# Patient Record
Sex: MALE | Race: WHITE
[De-identification: names, ages, dates, MRNs, and addresses within clinical notes are randomized per-mention and may not be internally consistent; named-entity substitution may affect disease eponyms.]

---

## 2021-07-12 ENCOUNTER — HOSPITAL ENCOUNTER (INPATIENT)
Dept: HOSPITAL 11 - JP.SDS | Age: 81
LOS: 3 days | Discharge: HOME | DRG: 470 | End: 2021-07-15
Attending: ORTHOPAEDIC SURGERY | Admitting: ORTHOPAEDIC SURGERY
Payer: MEDICARE

## 2021-07-12 DIAGNOSIS — M25.852: ICD-10-CM

## 2021-07-12 DIAGNOSIS — D62: ICD-10-CM

## 2021-07-12 DIAGNOSIS — M16.12: Primary | ICD-10-CM

## 2021-07-12 DIAGNOSIS — E11.9: ICD-10-CM

## 2021-07-12 DIAGNOSIS — S73.102A: ICD-10-CM

## 2021-07-12 DIAGNOSIS — Z79.899: ICD-10-CM

## 2021-07-12 DIAGNOSIS — G47.30: ICD-10-CM

## 2021-07-12 DIAGNOSIS — I10: ICD-10-CM

## 2021-07-12 DIAGNOSIS — Z79.82: ICD-10-CM
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
## 2021-07-12 PROCEDURE — C1776 JOINT DEVICE (IMPLANTABLE): HCPCS

## 2021-07-12 PROCEDURE — 0SRB01A REPLACEMENT OF LEFT HIP JOINT WITH METAL SYNTHETIC SUBSTITUTE, UNCEMENTED, OPEN APPROACH: ICD-10-PCS | Performed by: ORTHOPAEDIC SURGERY

## 2021-07-12 PROCEDURE — C1713 ANCHOR/SCREW BN/BN,TIS/BN: HCPCS

## 2021-07-12 RX ADMIN — Medication SCH APPLIC: at 21:23

## 2021-07-12 RX ADMIN — OXYCODONE HYDROCHLORIDE AND ACETAMINOPHEN PRN TAB: 5; 325 TABLET ORAL at 12:38

## 2021-07-12 RX ADMIN — Medication SCH: at 21:17

## 2021-07-12 RX ADMIN — OXYCODONE HYDROCHLORIDE AND ACETAMINOPHEN PRN TAB: 5; 325 TABLET ORAL at 21:15

## 2021-07-12 NOTE — CR
Pelvis 1V or 2V

 

CLINICAL HISTORY: Status post CORAL

 

FINDINGS: Patient is status post recent total hip arthroplasty. Components

appear well seated. There is some osteoarthritic change in the right hip

 

 

 

IMPRESSION: Status post total left hip arthroplasty

## 2021-07-13 RX ADMIN — OXYCODONE HYDROCHLORIDE AND ACETAMINOPHEN PRN TAB: 5; 325 TABLET ORAL at 02:38

## 2021-07-13 RX ADMIN — HYDROCODONE BITARTRATE AND ACETAMINOPHEN PRN TAB: 5; 325 TABLET ORAL at 20:19

## 2021-07-13 RX ADMIN — Medication SCH APPLIC: at 08:46

## 2021-07-13 RX ADMIN — Medication SCH: at 21:51

## 2021-07-13 RX ADMIN — OXYCODONE HYDROCHLORIDE AND ACETAMINOPHEN PRN TAB: 5; 325 TABLET ORAL at 10:40

## 2021-07-13 RX ADMIN — Medication SCH APPLIC: at 20:20

## 2021-07-13 RX ADMIN — Medication SCH MG: at 08:48

## 2021-07-13 RX ADMIN — Medication SCH: at 08:46

## 2021-07-13 NOTE — PCM.SURGPN
- General Info


Date of Service: 07/13/21


Date of Surgery/Procedure: 07/12/21


POD#: 1


Post-Op Diagnosis: left hip osteoarthritis


Functional Status: Reports: Pain Controlled, Tolerating Diet





- Review of Systems


General: Reports: No Symptoms


Musculoskeletal: Reports: Leg Pain (left ), Joint Pain (left hip )


Skin: Reports: Other (dried drainage from incision )


Neurological: Reports: No Symptoms


Psychiatric: Reports: No Symptoms





- Patient Data


Vitals - Most Recent: 


                                Last Vital Signs











Temp  96.9 F   07/13/21 07:26


 


Pulse  75   07/13/21 07:26


 


Resp  18   07/13/21 07:26


 


BP  114/64   07/13/21 07:26


 


Pulse Ox  95   07/13/21 07:26











Weight - Most Recent: 204 lb


I&O - Last 24 Hours: 


                                 Intake & Output











 07/12/21 07/13/21 07/13/21





 22:59 06:59 14:59


 


Intake Total 715 1413 


 


Output Total 700 750 


 


Balance 15 663 











Lab Results Last 24 Hrs: 


                         Laboratory Results - last 24 hr











  07/12/21 07/13/21 07/13/21 Range/Units





  18:25 06:03 07:48 


 


WBC   7.1   (4.5-11.0)  K/uL


 


RBC   3.37 L   (4.30-5.90)  M/uL


 


Hgb   10.8 L   (12.0-15.0)  g/dL


 


Hct   31.6 L   (40.0-54.0)  %


 


MCV   94   (80-98)  fL


 


MCH   32 H   (27-31)  pg


 


MCHC   34   (32-36)  %


 


Plt Count   182   (150-400)  K/uL


 


POC Glucose  177 H   167 H  ()  mg/dL











Med Orders - Current: 


                               Current Medications





Acetaminophen (Acetaminophen 325 Mg Tab)  650 mg PO Q4H PRN


   PRN Reason: Pain/Fever


Hydrocodone Bitart/Acetaminophen (Acetaminophen/Hydrocodone 325-5 Mg Tab)  1 tab

PO Q4H PRN


   PRN Reason: Pain (mild 1-3)


Amlodipine Besylate (Amlodipine 5 Mg Tab)  10 mg PO DAILY [facility]


Bandage/Support Products (Nozin Nasal )  1 applic NASBOTH BID [facility]


   Stop: 07/19/21 21:01


   Last Admin: 07/12/21 21:23 Dose:  1 applic


   Documented by: 


Clopidogrel Bisulfate (Clopidogrel 75 Mg Tab)  75 mg PO DAILY [facility]


Cyclobenzaprine HCl (Cyclobenzaprine 10 Mg Tab)  10 mg PO BID PRN


   PRN Reason: Muscle Spasm - Painful


   Last Admin: 07/12/21 23:33 Dose:  10 mg


   Documented by: 


Diphenhydramine HCl (Diphenhydramine 25 Mg Cap)  25 mg PO Q6H PRN


   PRN Reason: Itching


Docusate Sodium (Docusate Sodium 100 Mg Cap)  100 mg PO BID [facility]


   Last Admin: 07/12/21 21:17 Dose:  100 mg


   Documented by: 


Glimepiride (Glimepiride 2 Mg Tab)  4 mg PO DAILY [facility]


Hydrochlorothiazide (Hydrochlorothiazide 25 Mg Tab)  25 mg PO DAILY [facility]


Sodium Chloride (Normal Saline)  1,000 mls @ 125 mls/hr IV ASDIRECTED [facility]


   Last Admin: 07/12/21 23:33 Dose:  125 mls/hr


   Documented by: 


Cefazolin Sodium/Dextrose 1 gm (/ Premix)  50 mls @ 100 mls/hr IV Q8H [facility]


   Stop: 07/13/21 10:29


   Last Admin: 07/13/21 02:34 Dose:  100 mls/hr


   Documented by: 


Lorazepam (Lorazepam 1 Mg Tab)  1 mg PO BID PRN


   PRN Reason: Anxiety


   Last Admin: 07/12/21 21:15 Dose:  1 mg


   Documented by: 


Losartan Potassium (Losartan 50 Mg Tab)  100 mg PO DAILY [facility]


Magnesium Hydroxide (Magnesium Hydroxide 400 Mg/5 Ml Susp 30 Ml Cup)  30 ml PO 

Q6H PRN


   PRN Reason: Stool Softener


Metoprolol Succinate (Metoprolol Succinate 25 Mg Tab.Er)  25 mg PO DAILY [facility]


Morphine Sulfate (Morphine 2 Mg/Ml Syringe)  1 mg IV Q1H PRN


   PRN Reason: Breakthrough Pain


   Last Admin: 07/12/21 14:34 Dose:  1 mg


   Documented by: 


Niacin (Niacin 250 Mg Tab.Er)  500 mg PO DAILY [facility]


Nitroglycerin (Nitroglycerin 0.4 Mg Tab.Sl)  0.4 mg SL ASDIRECTED PRN


   PRN Reason: Other


Ondansetron HCl (Ondansetron 4 Mg/2 Ml Sdv)  4 mg IVPUSH Q6H PRN


   PRN Reason: Nausea/Vomiting


Oxycodone/Acetaminophen (Acetaminophen/Oxycodone 325-5 Mg Tab)  1 - 2 tab PO Q4H

PRN


   PRN Reason: Pain


   Last Admin: 07/13/21 02:38 Dose:  2 tab


   Documented by: 


Glucosamine 1,000mg (Tab (Ptom))  1 each PO BID [facility]


   Last Admin: 07/12/21 21:17 Dose:  Not Given


   Documented by: 


Rosuvastatin Calcium (Rosuvastatin 10 Mg Tab)  10 mg PO DAILY [facility]





Discontinued Medications





Amlodipine Besylate (Amlodipine 5 Mg Tab)  10 mg PO DAILY [facility]


Bandage/Support Products (Nozin Nasal )  1 applic NASBOTH ONETIME ONE


   Stop: 07/12/21 08:01


   Last Admin: 07/12/21 08:20 Dose:  3 swab


   Documented by: 


Clopidogrel Bisulfate (Clopidogrel 75 Mg Tab)  75 mg PO DAILY [facility]


Docusate Sodium (Docusate Sodium 100 Mg Cap)  100 mg PO BID [facility]


Ephedrine Sulfate (Ephedrine 50 Mg/Ml Sdv) Confirm Administered Dose 50 mg 

.ROUTE .STK-MED ONE


   Stop: 07/12/21 10:15


Fentanyl (Fentanyl 100 Mcg/2 Ml Sdv) Confirm Administered Dose 100 mcg .ROUTE 

.STK-MED ONE


   Stop: 07/12/21 08:30


Lactated Ringer's (Ringers, Lactated)  1,000 mls @ 75 mls/hr IV ASDIRECTED [facility]


   Last Admin: 07/12/21 08:21 Dose:  75 mls/hr


   Documented by: 


Cefazolin Sodium/Dextrose 2 gm (/ Premix)  50 mls @ 100 mls/hr IV ONETIME ONE


   Stop: 07/12/21 08:59


   Last Admin: 07/12/21 09:47 Dose:  100 mls/hr


   Documented by: 


Cefazolin Sodium 1 gm/ Sodium (Chloride)  50 mls @ 200 mls/hr IV Q8H [facility]


   Stop: 07/13/21 03:59


   Last Admin: 07/12/21 15:29 Dose:  Not Given


   Documented by: 


Ketorolac Tromethamine (Ketorolac 30 Mg/Ml Sdv)  30 mg IVPUSH ONETIME ONE


   Stop: 07/12/21 15:41


   Last Admin: 07/12/21 15:49 Dose:  30 mg


   Documented by: 


Metoprolol Succinate (Metoprolol Succinate 25 Mg Tab.Er)  25 mg PO DAILY [facility]


Midazolam HCl (Midazolam 1 Mg/Ml 2 Ml Sdv) Confirm Administered Dose 2 mg .ROUTE

.STK-MED ONE


   Stop: 07/12/21 08:31


Midazolam HCl (Midazolam 1 Mg/Ml 2 Ml Sdv) Confirm Administered Dose 2 mg .ROUTE

.STK-MED ONE


   Stop: 07/12/21 08:57


Non-Formulary Medication (Glimepiride [Glimepiride])  4 mg PO DAILY [facility]


Non-Formulary Medication (Glucosamine Sulfate 2kcl [Glucosamine])  1 tab PO BID 

[facility]


Non-Formulary Medication (Hydrochlorothiazide/Losartan [Hyzaar 100-25 Mg])  1 

tab PO DAILY [facility]


Non-Formulary Medication (Niacin [Niacin])  500 mg PO DAILY [facility]


Non-Formulary Medication (Rosuvastatin [Crestor])  20 mg PO DAILY [facility]


Povidone Iodine (Povidone-Iodine 10% Soln 118.25 Ml Bottle) Confirm Administered

Dose 1 ml .ROUTE .STK-MED ONE


   Stop: 07/12/21 06:25


   Last Admin: 07/12/21 10:31 Dose:  15 ml


   Documented by: 


Propofol (Propofol 200 Mg/20 Ml Sdv) Confirm Administered Dose 200 mg .ROUTE 

.STK-MED ONE


   Stop: 07/12/21 08:30











- Exam


Wound/Incisions: Dressing Dry and Intact


Quality Assessment: DVT Prophylaxis


General: Alert, Oriented, Cooperative, No Acute Distress


Extremities: Leg Pain, Increased Warmth


Skin: Dry, Intact


Neurological: No New Focal Deficit


Psy/Mental Status: Alert, Normal Affect, Normal Mood





Sepsis Event Note





- Evaluation


Sepsis Screening Result: No Definite Risk





- Focused Exam


Vital Signs: 


                                   Vital Signs











  Temp Pulse Resp BP Pulse Ox


 


 07/13/21 07:26  96.9 F  75  18  114/64  95


 


 07/13/21 02:35  95 F L  79  16  120/58 L  99


 


 07/12/21 23:34  97.1 F  80  16  126/61  97














- Problem List & Annotations


(1) Postoperative anemia due to acute blood loss


SNOMED Code(s): 76550002452010611


   Code(s): D62 - ACUTE POSTHEMORRHAGIC ANEMIA   Status: Acute   Current Visit: 

Yes   





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


SNOMED Code(s): 010328791601, 896876277617


   Code(s): Z96.642 - PRESENCE OF LEFT ARTIFICIAL HIP JOINT   Status: Acute   

Current Visit: Yes   





- Problem List Review


Problem List Initiated/Reviewed/Updated: Yes





- My Orders


Last 24 Hours: 


                               Active Orders 24 hr











 Category Date Time Status


 


 Patient Status [ADT] Routine ADT  07/12/21 11:34 Active


 


 Ambulate [RC] PER UNIT ROUTINE Care  07/12/21 11:34 Active


 


 Head of Bed Elevation [RC] ASDIRECTED Care  07/12/21 11:34 Active


 


 Intake and Output [RC] PER UNIT ROUTINE Care  07/12/21 11:34 Active


 


 Neurovascular Check [RC] BID Care  07/12/21 11:34 Active


 


 Notify Provider Intake and Out [RC] ASDIRECTED Care  07/12/21 11:34 Active


 


 Notify Provider Vital Signs [RC] ASDIRECTED Care  07/12/21 11:34 Active


 


 Oxygen Therapy [RC] PRN Care  07/12/21 11:34 Active


 


 POC Glucose [Blood Glucose Check, Bedside] [RC] Care  07/12/21 11:54 Active





 BIDMEALS   


 


 Pneumonia Education [RC] UPON Care  07/12/21 11:34 Active


 


 Pulse Oximetry [RC] INTERMITTENT Care  07/12/21 11:34 Active


 


 RT Incentive Spirometry [RC] Q1HWA Care  07/12/21 11:34 Active


 


 Up to Chair [RC] QID Care  07/12/21 11:34 Active


 


 Vital Signs [RC] PER UNIT ROUTINE Care  07/12/21 11:34 Active


 


 Wound Care [RC] Q12H Care  07/12/21 11:34 Active


 


 Consult to Case Management/ [CONS] Cons  07/12/21 11:34 Active





 Routine   


 


 OT Evaluation and Treatment [CONS] Routine Cons  07/12/21 11:37 Active


 


 PT Evaluation and Treatment [CONS] Routine Cons  07/12/21 11:34 Active


 


 PT Evaluation and Treatment [CONS] Routine Cons  07/12/21 11:34 Active


 


 Consistent Carbohydrate Diet [DIET] Diet  07/12/21 Lunch Active


 


 GLUCOSE POC LAB TO COLLECT JPM [POC] BIDAC Lab  07/13/21 17:00 Ordered


 


 GLUCOSE POC LAB TO COLLECT JPM [POC] BIDAC Lab  07/14/21 08:00 Ordered


 


 GLUCOSE POC LAB TO COLLECT JPM [POC] BIDAC Lab  07/14/21 17:00 Ordered


 


 GLUCOSE POC LAB TO COLLECT JPM [POC] BIDAC Lab  07/15/21 08:00 Ordered


 


 GLUCOSE POC LAB TO COLLECT JPM [POC] BIDAC Lab  07/15/21 17:00 Ordered


 


 GLUCOSE POC LAB TO COLLECT JPM [POC] BIDAC Lab  07/16/21 08:00 Ordered


 


 GLUCOSE POC LAB TO COLLECT JPM [POC] BIDAC Lab  07/16/21 17:00 Ordered


 


 GLUCOSE POC LAB TO COLLECT JPM [POC] BIDAC Lab  07/17/21 08:00 Ordered


 


 GLUCOSE POC LAB TO COLLECT JPM [POC] BIDAC Lab  07/17/21 17:00 Ordered


 


 GLUCOSE POC LAB TO COLLECT JPM [POC] BIDAC Lab  07/18/21 08:00 Ordered


 


 GLUCOSE POC LAB TO COLLECT JPM [POC] BIDAC Lab  07/18/21 17:00 Ordered


 


 GLUCOSE POC LAB TO COLLECT JPM [POC] BIDAC Lab  07/19/21 08:00 Ordered


 


 GLUCOSE POC LAB TO COLLECT JPM [POC] BIDAC Lab  07/19/21 17:00 Ordered


 


 Acetaminophen [TylenoL] Med  07/12/21 11:34 Active





 650 mg PO Q4H PRN   


 


 Acetaminophen/HYDROcodone [Norco 325-5 MG] Med  07/12/21 11:34 Active





 1 tab PO Q4H PRN   


 


 Acetaminophen/oxyCODONE [Percocet 325-5 MG] Med  07/12/21 11:43 Active





 1 - 2 tab PO Q4H PRN   


 


 Clopidogrel [Plavix] Med  07/13/21 09:00 Active





 75 mg PO DAILY   


 


 Cyclobenzaprine [Flexeril] Med  07/12/21 16:35 Active





 10 mg PO BID PRN   


 


 Docusate Sodium [Colace] Med  07/12/21 21:00 Active





 100 mg PO BID   


 


 Glimepiride [Amaryl] Med  07/13/21 09:00 Active





 4 mg PO DAILY   


 


 LORazepam [Ativan] Med  07/12/21 11:40 Active





 1 mg PO BID PRN   


 


 Losartan [Cozaar] Med  07/13/21 09:00 Active





 100 mg PO DAILY   


 


 Magnesium Hydroxide [Milk of Magnesia] Med  07/12/21 11:34 Active





 30 ml PO Q6H PRN   


 


 Metoprolol Succinate [Toprol XL] Med  07/13/21 09:00 Active





 25 mg PO DAILY   


 


 Morphine Med  07/12/21 11:34 Active





 1 mg IV Q1H PRN   


 


 Niacin [Slo-Niacin] Med  07/13/21 09:00 Active





 500 mg PO DAILY   


 


 Nitroglycerin [Nitrostat] Med  07/12/21 11:40 Active





 0.4 mg SL ASDIRECTED PRN   


 


 Nozin [ Nasal ] Med  07/12/21 21:00 Active





 1 applic NASBOTH BID   


 


 Ondansetron [Zofran] Med  07/12/21 11:34 Active





 4 mg IVPUSH Q6H PRN   


 


 Patient's Own Medication [Ptom] Med  07/12/21 21:00 Active





 1 each PO BID   


 


 Rosuvastatin [Crestor] Med  07/13/21 09:00 Active





 10 mg PO DAILY   


 


 Sodium Chloride 0.9% [Normal Saline] 1,000 ml Med  07/12/21 11:45 Active





 IV ASDIRECTED   


 


 amLODIPine [Norvasc] Med  07/13/21 09:00 Active





 10 mg PO DAILY   


 


 ceFAZolin [Ancef 1 GM/50 ML] 1 gm Med  07/12/21 18:00 Active





 Premix Bag 1 bag   





 IV Q8H   


 


 diphenhydrAMINE [Benadryl] Med  07/12/21 11:44 Active





 25 mg PO Q6H PRN   


 


 hydroCHLOROthiazide Med  07/13/21 09:00 Active





 25 mg PO DAILY   


 


 Antiembolic Hose [OM.PC] Per Unit Routine Oth  07/12/21 11:34 Ordered


 


 DME for Inpatients [OM.PC] Routine Oth  07/12/21 11:34 Ordered


 


 DVT/VTE Prophylaxis Reflex [OM.PC] Routine Oth  07/12/21 11:34 Ordered


 


 Ice Therapy [OM.PC] Per Unit Routine Oth  07/12/21 11:34 Ordered


 


 Oral Care [OM.PC] Routine Oth  07/12/21 11:34 Ordered


 


 Sequential Compression Device [OM.PC] Routine Oth  07/12/21 11:34 Ordered


 


 Weight bearing status [OM.PC] Routine Oth  07/12/21 11:34 Ordered


 


 Resuscitation Status Routine Resus Stat  07/12/21 11:34 Ordered


 


 EKG 12 Lead [EK] Urgent Ther  07/12/21 07:20 Ordered








                                Medication Orders





Acetaminophen (Acetaminophen 325 Mg Tab)  650 mg PO Q4H PRN


   PRN Reason: Pain/Fever


Hydrocodone Bitart/Acetaminophen (Acetaminophen/Hydrocodone 325-5 Mg Tab)  1 tab

 PO Q4H PRN


   PRN Reason: Pain (mild 1-3)


Amlodipine Besylate (Amlodipine 5 Mg Tab)  10 mg PO DAILY [facility]


Bandage/Support Products (Nozin Nasal )  1 applic NASBOTH BID [facility]


   Stop: 07/19/21 21:01


   Last Admin: 07/12/21 21:23  Dose: 1 applic


   Documented by: JOE


Clopidogrel Bisulfate (Clopidogrel 75 Mg Tab)  75 mg PO DAILY [facility]


Cyclobenzaprine HCl (Cyclobenzaprine 10 Mg Tab)  10 mg PO BID PRN


   PRN Reason: Muscle Spasm - Painful


   Last Admin: 07/12/21 23:33  Dose: 10 mg


   Documented by: JOE


Diphenhydramine HCl (Diphenhydramine 25 Mg Cap)  25 mg PO Q6H PRN


   PRN Reason: Itching


Docusate Sodium (Docusate Sodium 100 Mg Cap)  100 mg PO BID [facility]


   Last Admin: 07/12/21 21:17  Dose: 100 mg


   Documented by: JOE


Glimepiride (Glimepiride 2 Mg Tab)  4 mg PO DAILY [facility]


Hydrochlorothiazide (Hydrochlorothiazide 25 Mg Tab)  25 mg PO DAILY [facility]


Sodium Chloride (Normal Saline)  1,000 mls @ 125 mls/hr IV ASDIRECTED [facility]


   Last Admin: 07/12/21 23:33  Dose: 125 mls/hr


   Documented by: JOE


Cefazolin Sodium/Dextrose 1 gm (/ Premix)  50 mls @ 100 mls/hr IV Q8H [facility]


   Stop: 07/13/21 10:29


   Last Admin: 07/13/21 02:34  Dose: 100 mls/hr


   Documented by: JOE


   Infusion: 07/12/21 18:25  Dose: 100 mls/hr


   Documented by: JOE


   Admin: 07/12/21 17:55  Dose: 100 mls/hr


   Documented by: JENNI


Lorazepam (Lorazepam 1 Mg Tab)  1 mg PO BID PRN


   PRN Reason: Anxiety


   Last Admin: 07/12/21 21:15  Dose: 1 mg


   Documented by: JOE


Losartan Potassium (Losartan 50 Mg Tab)  100 mg PO DAILY [facility]


Magnesium Hydroxide (Magnesium Hydroxide 400 Mg/5 Ml Susp 30 Ml Cup)  30 ml PO 

Q6H PRN


   PRN Reason: Stool Softener


Metoprolol Succinate (Metoprolol Succinate 25 Mg Tab.Er)  25 mg PO DAILY [facility]


Morphine Sulfate (Morphine 2 Mg/Ml Syringe)  1 mg IV Q1H PRN


   PRN Reason: Breakthrough Pain


   Last Admin: 07/12/21 14:34  Dose: 1 mg


   Documented by: MANUELITO


   Admin: 07/12/21 13:36  Dose: 1 mg


   Documented by: YOHANNES


Niacin (Niacin 250 Mg Tab.Er)  500 mg PO DAILY [facility]


Nitroglycerin (Nitroglycerin 0.4 Mg Tab.Sl)  0.4 mg SL ASDIRECTED PRN


   PRN Reason: Other


Ondansetron HCl (Ondansetron 4 Mg/2 Ml Sdv)  4 mg IVPUSH Q6H PRN


   PRN Reason: Nausea/Vomiting


Oxycodone/Acetaminophen (Acetaminophen/Oxycodone 325-5 Mg Tab)  1 - 2 tab PO Q4H

 PRN


   PRN Reason: Pain


   Last Admin: 07/13/21 02:38  Dose: 2 tab


   Documented by: JOE


   Admin: 07/12/21 21:15  Dose: 2 tab


   Documented by: JOE


   Admin: 07/12/21 12:38  Dose: 2 tab


   Documented by: JENNI


Glucosamine 1,000mg (Tab (Ptom))  1 each PO BID [facility]


   Last Admin: 07/12/21 21:17 Dose:  Not Given


   Documented by: JOE


Rosuvastatin Calcium (Rosuvastatin 10 Mg Tab)  10 mg PO DAILY [facility]











- Assessment


Assessment           (Free Text/Narrative):: 


Patient is a pleasant 80-year-old male, status post left total hip arthroplasty,

 postop day #1.  No acute events overnight.  Patient remains hemodynamically 

stable, vitals within normal limits.  Patient denied subjective fever, chills, 

dyspnea, chest pain. Endorsed using incentive spirometer 2-3 times hourly. 





Postop day #1 hemoglobin declined to 10.8.  Patient appears asymptomatic with 

this; had one episode of dizziness last night with sit to stand transfer, but 

denied any dizziness or lightheadedness thereafter.





Patient had difficulty with pain control yesterday afternoon after the block 

wore off.  Since then, patient has obtained good pain control with Percocet and 

Flexeril.  Denied numbness or tingling to the lower extremity.  Had one episode 

of nausea yesterday afternoon, Zofran was utilized.  Patient denied any nausea 

or vomiting since and has been tolerating regular diet well.





Patient worked with physical therapy yesterday afternoon following surgery; 

performed bilateral lower extremity strengthening exercises and transferred from

 bed to chair with four-wheel walker.  Patient also ambulated from bed to 

bathroom yesterday evening with FWW and x1 assist. 





Patient did have some drainage from the left hip dressing yesterday evening; 

nursing staff reinforce dressing with extra ABD.  Has had no drainage since.





Patient requires inpatient status at this time for further physical therapy 

services to progress ambulation abilities and for adequate pain control with PO 

medications prior to discharge home.





Exam: Left lower extremity neurovascular intact.  Calf is soft and supple.  

Dried drainage on left hip dressing, no active drainage.  No surrounding 

ecchymosis of L hip.  Mild warmth to touch of left hip.  Mild edema of left 

lower extremity compared to right lower extremity.  Dorsiflexion 4+/5.  Plantar 

flexion 5/5.





Plan: 


* Participate to in physical therapy and occupational therapy daily while in the

   hospital. 


* Postoperative anemia stable at this time; may recheck CBC if patient becomes 

  symptomatic.


* Continue with current pain regimen.


* Nursing staff may discontinue linares this morning and saline lock patients IV; 

  this was communicated to nursing staff this morning. 


* Dressing change will be performed on POD#2 by orthopedic provider. 


* Continue with mechanical DVT/VTE prophylaxis with bilateral SCDs, chemical 

  prophylaxis with patient's home clopidogrel dose.


* Anticipate discharge to home when medically stable, pain controlled with PO 

  medications, and ambulation abilities progress. Patient interested in 

  outpatient PT at time of discharge.

## 2021-07-14 RX ADMIN — Medication SCH: at 21:21

## 2021-07-14 RX ADMIN — OXYCODONE HYDROCHLORIDE AND ACETAMINOPHEN PRN TAB: 5; 325 TABLET ORAL at 22:45

## 2021-07-14 RX ADMIN — OXYCODONE HYDROCHLORIDE AND ACETAMINOPHEN PRN TAB: 5; 325 TABLET ORAL at 14:28

## 2021-07-14 RX ADMIN — OXYCODONE HYDROCHLORIDE AND ACETAMINOPHEN PRN TAB: 5; 325 TABLET ORAL at 08:20

## 2021-07-14 RX ADMIN — Medication SCH MG: at 08:23

## 2021-07-14 RX ADMIN — OXYCODONE HYDROCHLORIDE AND ACETAMINOPHEN PRN TAB: 5; 325 TABLET ORAL at 18:40

## 2021-07-14 RX ADMIN — Medication SCH: at 08:23

## 2021-07-14 RX ADMIN — Medication SCH APPLIC: at 21:20

## 2021-07-14 RX ADMIN — Medication SCH APPLIC: at 08:20

## 2021-07-14 RX ADMIN — HYDROCODONE BITARTRATE AND ACETAMINOPHEN PRN TAB: 5; 325 TABLET ORAL at 01:26

## 2021-07-14 NOTE — [id number].[id number]
- General Info


Date of Service: 07/14/21


Date of Surgery/Procedure: 07/12/21


POD#: 2


Post-Op Diagnosis: left hip osteoarthritis


Functional Status: Reports: Tolerating Diet, Ambulating (with fww, x1 standby 

assist ), Urinating, Incentive Spirometry





- Review of Systems


General: Reports: No Symptoms


HEENT: Reports: No Symptoms


Pulmonary: Reports: No Symptoms


Cardiovascular: Reports: No Symptoms


Gastrointestinal: Reports: No Symptoms


Genitourinary: Reports: No Symptoms


Musculoskeletal: Reports: Leg Pain (L ), Joint Pain (L hip )


Skin: Reports: Bruising


Neurological: Reports: No Symptoms


Psychiatric: Reports: No Symptoms





- Patient Data


Vitals - Most Recent: 


                                Last Vital Signs











Temp  96.1 F L  07/14/21 15:00


 


Pulse  70   07/14/21 15:00


 


Resp  16   07/14/21 15:00


 


BP  126/55 L  07/14/21 15:00


 


Pulse Ox  95   07/14/21 15:00











Weight - Most Recent: 204 lb 0.005 oz


I&O - Last 24 Hours: 


                                 Intake & Output











 07/14/21 07/14/21 07/14/21





 06:59 14:59 22:59


 


Intake Total 300 480 


 


Balance 300 480 











Lab Results Last 24 Hrs: 


                         Laboratory Results - last 24 hr











  07/14/21 07/14/21 Range/Units





  08:06 16:58 


 


POC Glucose  92  147 H  ()  mg/dL











Med Orders - Current: 


                               Current Medications





Acetaminophen (Acetaminophen 325 Mg Tab)  650 mg PO Q4H PRN


   PRN Reason: Pain/Fever


   Last Admin: 07/14/21 11:44 Dose:  650 mg


   Documented by: 


Hydrocodone Bitart/Acetaminophen (Acetaminophen/Hydrocodone 325-5 Mg Tab)  1 tab

PO Q4H PRN


   PRN Reason: Pain (mild 1-3)


   Last Admin: 07/14/21 01:26 Dose:  1 tab


   Documented by: 


Amlodipine Besylate (Amlodipine 5 Mg Tab)  10 mg PO DAILY [facility]


   Last Admin: 07/14/21 08:22 Dose:  10 mg


   Documented by: 


Bandage/Support Products (Nozin Nasal )  1 applic NASBOTH BID [facility]


   Stop: 07/19/21 21:01


   Last Admin: 07/14/21 08:20 Dose:  1 applic


   Documented by: 


Clopidogrel Bisulfate (Clopidogrel 75 Mg Tab)  75 mg PO DAILY [facility]


   Last Admin: 07/14/21 08:22 Dose:  75 mg


   Documented by: 


Cyclobenzaprine HCl (Cyclobenzaprine 10 Mg Tab)  10 mg PO BID PRN


   PRN Reason: Muscle Spasm - Painful


   Last Admin: 07/14/21 01:55 Dose:  10 mg


   Documented by: 


Diphenhydramine HCl (Diphenhydramine 25 Mg Cap)  25 mg PO Q6H PRN


   PRN Reason: Itching


   Last Admin: 07/13/21 22:35 Dose:  25 mg


   Documented by: 


Docusate Sodium (Docusate Sodium 100 Mg Cap)  100 mg PO BID [facility]


   Last Admin: 07/14/21 08:21 Dose:  100 mg


   Documented by: 


Glimepiride (Glimepiride 2 Mg Tab)  4 mg PO DAILY [facility]


   Last Admin: 07/14/21 08:21 Dose:  4 mg


   Documented by: 


Hydrochlorothiazide (Hydrochlorothiazide 25 Mg Tab)  25 mg PO DAILY [facility]


   Last Admin: 07/14/21 08:22 Dose:  25 mg


   Documented by: 


Sodium Chloride (Normal Saline)  1,000 mls @ 125 mls/hr IV ASDIRECTED [facility]


   Last Admin: 07/12/21 23:33 Dose:  125 mls/hr


   Documented by: 


Lorazepam (Lorazepam 1 Mg Tab)  1 mg PO BID PRN


   PRN Reason: Anxiety


   Last Admin: 07/13/21 22:35 Dose:  1 mg


   Documented by: 


Losartan Potassium (Losartan 50 Mg Tab)  100 mg PO DAILY [facility]


   Last Admin: 07/14/21 08:21 Dose:  100 mg


   Documented by: 


Magnesium Hydroxide (Magnesium Hydroxide 400 Mg/5 Ml Susp 30 Ml Cup)  30 ml PO 

Q6H PRN


   PRN Reason: Stool Softener


Metoprolol Succinate (Metoprolol Succinate 25 Mg Tab.Er)  25 mg PO DAILY [facility]


   Last Admin: 07/14/21 08:23 Dose:  25 mg


   Documented by: 


Morphine Sulfate (Morphine 2 Mg/Ml Syringe)  1 mg IV Q1H PRN


   PRN Reason: Breakthrough Pain


   Last Admin: 07/12/21 14:34 Dose:  1 mg


   Documented by: 


Niacin (Niacin 250 Mg Tab.Er)  500 mg PO DAILY [facility]


   Last Admin: 07/14/21 08:23 Dose:  500 mg


   Documented by: 


Nitroglycerin (Nitroglycerin 0.4 Mg Tab.Sl)  0.4 mg SL ASDIRECTED PRN


   PRN Reason: Other


Ondansetron HCl (Ondansetron 4 Mg/2 Ml Sdv)  4 mg IVPUSH Q6H PRN


   PRN Reason: Nausea/Vomiting


Oxycodone/Acetaminophen (Acetaminophen/Oxycodone 325-5 Mg Tab)  1 - 2 tab PO Q4H

PRN


   PRN Reason: Pain


   Last Admin: 07/14/21 18:40 Dose:  2 tab


   Documented by: 


Glucosamine 1,000mg (Tab (Ptom))  1 each PO BID [facility]


   Last Admin: 07/14/21 08:23 Dose:  Not Given


   Documented by: 


Rosuvastatin Calcium (Rosuvastatin 10 Mg Tab)  10 mg PO DAILY [facility]


   Last Admin: 07/14/21 08:22 Dose:  10 mg


   Documented by: 





Discontinued Medications





Amlodipine Besylate (Amlodipine 5 Mg Tab)  10 mg PO DAILY [facility]


Bandage/Support Products (Nozin Nasal )  1 applic NASBOTH ONETIME ONE


   Stop: 07/12/21 08:01


   Last Admin: 07/12/21 08:20 Dose:  3 swab


   Documented by: 


Clopidogrel Bisulfate (Clopidogrel 75 Mg Tab)  75 mg PO DAILY [facility]


Docusate Sodium (Docusate Sodium 100 Mg Cap)  100 mg PO BID [facility]


Ephedrine Sulfate (Ephedrine 50 Mg/Ml Sdv) Confirm Administered Dose 50 mg 

.ROUTE .STK-MED ONE


   Stop: 07/12/21 10:15


Fentanyl (Fentanyl 100 Mcg/2 Ml Sdv) Confirm Administered Dose 100 mcg .ROUTE 

.STK-MED ONE


   Stop: 07/12/21 08:30


Lactated Ringer's (Ringers, Lactated)  1,000 mls @ 75 mls/hr IV ASDIRECTED [facility]


   Last Admin: 07/12/21 08:21 Dose:  75 mls/hr


   Documented by: 


Cefazolin Sodium/Dextrose 2 gm (/ Premix)  50 mls @ 100 mls/hr IV ONETIME ONE


   Stop: 07/12/21 08:59


   Last Admin: 07/12/21 09:47 Dose:  100 mls/hr


   Documented by: 


Cefazolin Sodium 1 gm/ Sodium (Chloride)  50 mls @ 200 mls/hr IV Q8H [facility]


   Stop: 07/13/21 03:59


   Last Admin: 07/12/21 15:29 Dose:  Not Given


   Documented by: 


Cefazolin Sodium/Dextrose 1 gm (/ Premix)  50 mls @ 100 mls/hr IV Q8H [facility]


   Stop: 07/13/21 10:29


   Last Admin: 07/13/21 10:40 Dose:  100 mls/hr


   Documented by: 


Ketorolac Tromethamine (Ketorolac 30 Mg/Ml Sdv)  30 mg IVPUSH ONETIME ONE


   Stop: 07/12/21 15:41


   Last Admin: 07/12/21 15:49 Dose:  30 mg


   Documented by: 


Metoprolol Succinate (Metoprolol Succinate 25 Mg Tab.Er)  25 mg PO DAILY [facility]


Midazolam HCl (Midazolam 1 Mg/Ml 2 Ml Sdv) Confirm Administered Dose 2 mg .ROUTE

.STK-MED ONE


   Stop: 07/12/21 08:31


Midazolam HCl (Midazolam 1 Mg/Ml 2 Ml Sdv) Confirm Administered Dose 2 mg .ROUTE

.STK-MED ONE


   Stop: 07/12/21 08:57


Non-Formulary Medication (Glimepiride [Glimepiride])  4 mg PO DAILY [facility]


Non-Formulary Medication (Glucosamine Sulfate 2kcl [Glucosamine])  1 tab PO BID 

[facility]


Non-Formulary Medication (Hydrochlorothiazide/Losartan [Hyzaar 100-25 Mg])  1 

tab PO DAILY [facility]


Non-Formulary Medication (Niacin [Niacin])  500 mg PO DAILY [facility]


Non-Formulary Medication (Rosuvastatin [Crestor])  20 mg PO DAILY [facility]


Povidone Iodine (Povidone-Iodine 10% Soln 118.25 Ml Bottle) Confirm Administered

 Dose 1 ml .ROUTE .STK-MED ONE


   Stop: 07/12/21 06:25


   Last Admin: 07/12/21 10:31 Dose:  15 ml


   Documented by: 


Propofol (Propofol 200 Mg/20 Ml Sdv) Confirm Administered Dose 200 mg .ROUTE 

.STK-MED ONE


   Stop: 07/12/21 08:30











- Exam


Wound/Incisions: Healing Well, Dressing Dry and Intact, No Drainage


Quality Assessment: DVT Prophylaxis


General: Alert, Oriented, Cooperative, No Acute Distress


Extremities: Normal Capillary Refill, Leg Pain (left ), Limited Range of Motion


Skin: Dry, Intact, Other


Neurological: No New Focal Deficit


Psy/Mental Status: Alert, Normal Affect, Normal Mood





Sepsis Event Note





- Evaluation


Sepsis Screening Result: No Definite Risk





- Focused Exam


Vital Signs: 


                                   Vital Signs











  Temp Pulse Pulse Resp BP BP Pulse Ox


 


 07/14/21 15:00  96.1 F L   70  16   126/55 L  95


 


 07/14/21 11:38  96 F L   76  16   127/51 L  99


 


 07/14/21 08:23   86    142/56 H  


 


 07/14/21 08:22      142/56 H  


 


 07/14/21 08:21      142/56 H  


 


 07/14/21 07:45  97.2 F   86  16   142/56 H  97














- Problem List & Annotations


(1) Postoperative anemia due to acute blood loss


SNOMED Code(s): 40811535177506898


   Code(s): D62 - ACUTE POSTHEMORRHAGIC ANEMIA   Status: Acute   Current Visit: 

Yes   





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


SNOMED Code(s): 815529221132, 256560982776


   Code(s): Z96.642 - PRESENCE OF LEFT ARTIFICIAL HIP JOINT   Status: Acute   

Current Visit: Yes   





- Problem List Review


Problem List Initiated/Reviewed/Updated: Yes





- My Orders


Last 24 Hours: 


                               Active Orders 24 hr











 Category Date Time Status


 


 GLUCOSE POC LAB TO COLLECT JPM [POC] BIDAC Lab  07/15/21 08:00 Ordered


 


 GLUCOSE POC LAB TO COLLECT JPM [POC] BIDAC Lab  07/15/21 17:00 Ordered


 


 GLUCOSE POC LAB TO COLLECT JPM [POC] BIDAC Lab  07/16/21 08:00 Ordered


 


 GLUCOSE POC LAB TO COLLECT JPM [POC] BIDAC Lab  07/16/21 17:00 Ordered


 


 GLUCOSE POC LAB TO COLLECT JPM [POC] BIDAC Lab  07/17/21 08:00 Ordered


 


 GLUCOSE POC LAB TO COLLECT JPM [POC] BIDAC Lab  07/17/21 17:00 Ordered


 


 GLUCOSE POC LAB TO COLLECT JPM [POC] BIDAC Lab  07/18/21 08:00 Ordered


 


 GLUCOSE POC LAB TO COLLECT JPM [POC] BIDAC Lab  07/18/21 17:00 Ordered


 


 GLUCOSE POC LAB TO COLLECT JPM [POC] BIDAC Lab  07/19/21 08:00 Ordered


 


 GLUCOSE POC LAB TO COLLECT JPM [POC] BIDAC Lab  07/19/21 17:00 Ordered








                                Medication Orders





Acetaminophen (Acetaminophen 325 Mg Tab)  650 mg PO Q4H PRN


   PRN Reason: Pain/Fever


   Last Admin: 07/14/21 11:44  Dose: 650 mg


   Documented by: CEZAR


   Admin: 07/13/21 22:35  Dose: 650 mg


   Documented by: NIKOLE


Hydrocodone Bitart/Acetaminophen (Acetaminophen/Hydrocodone 325-5 Mg Tab)  1 tab

 PO Q4H PRN


   PRN Reason: Pain (mild 1-3)


   Last Admin: 07/14/21 01:26  Dose: 1 tab


   Documented by: IVELISSE


   Admin: 07/13/21 20:19  Dose: 1 tab


   Documented by: NIKOLE


Amlodipine Besylate (Amlodipine 5 Mg Tab)  10 mg PO DAILY [facility]


   Last Admin: 07/14/21 08:22  Dose: 10 mg


   Documented by: CEZAR


   Admin: 07/13/21 08:48  Dose: 10 mg


   Documented by: JENNI


Bandage/Support Products (Nozin Nasal )  1 applic NASBOTH BID [facility]


   Stop: 07/19/21 21:01


   Last Admin: 07/14/21 08:20  Dose: 1 applic


   Documented by: CEZAR


   Admin: 07/13/21 20:20  Dose: 1 applic


   Documented by: NIKOLE


   Admin: 07/13/21 08:46  Dose: 1 applic


   Documented by: JENNI


   Admin: 07/12/21 21:23  Dose: 1 applic


   Documented by: JOE


Clopidogrel Bisulfate (Clopidogrel 75 Mg Tab)  75 mg PO DAILY [facility]


   Last Admin: 07/14/21 08:22  Dose: 75 mg


   Documented by: CEZAR


   Admin: 07/13/21 08:50  Dose: 75 mg


   Documented by: JENNI


Cyclobenzaprine HCl (Cyclobenzaprine 10 Mg Tab)  10 mg PO BID PRN


   PRN Reason: Muscle Spasm - Painful


   Last Admin: 07/14/21 01:55  Dose: 10 mg


   Documented by: DENITA


   Admin: 07/13/21 15:00  Dose: 10 mg


   Documented by: JENNI


   Admin: 07/12/21 23:33  Dose: 10 mg


   Documented by: JOE


Diphenhydramine HCl (Diphenhydramine 25 Mg Cap)  25 mg PO Q6H PRN


   PRN Reason: Itching


   Last Admin: 07/13/21 22:35  Dose: 25 mg


   Documented by: NIKOLE


Docusate Sodium (Docusate Sodium 100 Mg Cap)  100 mg PO BID [facility]


   Last Admin: 07/14/21 08:21  Dose: 100 mg


   Documented by: CEZAR


   Admin: 07/13/21 20:20  Dose: 100 mg


   Documented by: NIKOLE


   Admin: 07/13/21 08:50  Dose: 100 mg


   Documented by: JENNI


   Admin: 07/12/21 21:17  Dose: 100 mg


   Documented by: JOE


Glimepiride (Glimepiride 2 Mg Tab)  4 mg PO DAILY [facility]


   Last Admin: 07/14/21 08:21  Dose: 4 mg


   Documented by: CEZAR


   Admin: 07/13/21 08:48  Dose: 4 mg


   Documented by: JENNI


Hydrochlorothiazide (Hydrochlorothiazide 25 Mg Tab)  25 mg PO DAILY [facility]


   Last Admin: 07/14/21 08:22  Dose: 25 mg


   Documented by: CEZAR


   Admin: 07/13/21 08:49  Dose: 25 mg


   Documented by: JENNI


Sodium Chloride (Normal Saline)  1,000 mls @ 125 mls/hr IV ASDIRECTED [facility]


   Last Admin: 07/12/21 23:33  Dose: 125 mls/hr


   Documented by: JOE


Lorazepam (Lorazepam 1 Mg Tab)  1 mg PO BID PRN


   PRN Reason: Anxiety


   Last Admin: 07/13/21 22:35  Dose: 1 mg


   Documented by: NIKOLE


   Admin: 07/12/21 21:15  Dose: 1 mg


   Documented by: JOE


Losartan Potassium (Losartan 50 Mg Tab)  100 mg PO DAILY [facility]


   Last Admin: 07/14/21 08:21  Dose: 100 mg


   Documented by: CEZAR


   Admin: 07/13/21 08:49  Dose: 100 mg


   Documented by: JENNI


Magnesium Hydroxide (Magnesium Hydroxide 400 Mg/5 Ml Susp 30 Ml Cup)  30 ml PO 

Q6H PRN


   PRN Reason: Stool Softener


Metoprolol Succinate (Metoprolol Succinate 25 Mg Tab.Er)  25 mg PO DAILY [facility]


   Last Admin: 07/14/21 08:23  Dose: 25 mg


   Documented by: CEZAR


   Admin: 07/13/21 08:49  Dose: 25 mg


   Documented by: JENNI


Morphine Sulfate (Morphine 2 Mg/Ml Syringe)  1 mg IV Q1H PRN


   PRN Reason: Breakthrough Pain


   Last Admin: 07/12/21 14:34  Dose: 1 mg


   Documented by: MANUELITO


   Admin: 07/12/21 13:36  Dose: 1 mg


   Documented by: YOHANNES


Niacin (Niacin 250 Mg Tab.Er)  500 mg PO DAILY [facility]


   Last Admin: 07/14/21 08:23  Dose: 500 mg


   Documented by: CEZAR


   Admin: 07/13/21 08:48  Dose: 500 mg


   Documented by: JENNI


Nitroglycerin (Nitroglycerin 0.4 Mg Tab.Sl)  0.4 mg SL ASDIRECTED PRN


   PRN Reason: Other


Ondansetron HCl (Ondansetron 4 Mg/2 Ml Sdv)  4 mg IVPUSH Q6H PRN


   PRN Reason: Nausea/Vomiting


Oxycodone/Acetaminophen (Acetaminophen/Oxycodone 325-5 Mg Tab)  1 - 2 tab PO Q4H

 PRN


   PRN Reason: Pain


   Last Admin: 07/14/21 18:40  Dose: 2 tab


   Documented by: CEZAR


   Admin: 07/14/21 14:28  Dose: 2 tab


   Documented by: CEZAR


   Admin: 07/14/21 08:20  Dose: 2 tab


   Documented by: CEZAR


   Admin: 07/13/21 10:40  Dose: 1 tab


   Documented by: JENNI


   Admin: 07/13/21 02:38  Dose: 2 tab


   Documented by: JOE


   Admin: 07/12/21 21:15  Dose: 2 tab


   Documented by: JOE


   Admin: 07/12/21 12:38  Dose: 2 tab


   Documented by: JENNI


Glucosamine 1,000mg (Tab (Ptom))  1 each PO BID [facility]


   Last Admin: 07/14/21 08:23 Dose:  Not Given


   Documented by: CEZAR


   Admin: 07/13/21 21:51 Dose:  Not Given


   Documented by: NIKOLE


   Admin: 07/13/21 08:46 Dose:  Not Given


   Documented by: JENNI


   Admin: 07/12/21 21:17 Dose:  Not Given


   Documented by: JOE


Rosuvastatin Calcium (Rosuvastatin 10 Mg Tab)  10 mg PO DAILY [facility]


   Last Admin: 07/14/21 08:22  Dose: 10 mg


   Documented by: CEZAR


   Admin: 07/13/21 08:48  Dose: 10 mg


   Documented by: JENNI











- Assessment


Assessment           (Free Text/Narrative):: 





Patient is a pleasant 81 y/o male, s/p L total hip arthroplasty, POD #2. No 

acute events overnight. Patient remains hemodynamically stable. 





Pain has been well controlled with PO medications; increased pain with 

ambulation, but tolerable. Denied nausea/emesis. Has been eating consistent 

carbohydrate diet, tolerating well.





Has participated in OT and PT daily; ambulated 290 feet with FWW x1 standby 

assist. Completed 2 stairs safely. Continues to struggle with left leg transfer 

into and out of bed. Endorsed some dizziness with sit to stand this morning, but

 has since improved as the days gone on. 





Postoperative anemia stable. Denied subjective fever, chills, dyspnea, chest 

pain, nor malaise. 





Dressing changed on POD #2. IV saline locked POD #1. Knight discontinued POD #1. 





Patient continues to require inpatient status at this time to allow for further 

therapy services with transferring leg into and out of bed for a safe discharge 

to home. 





Exam: L LE neurovascular intact. Incision with steristrips above, dried 

drainage. No surrounding erythema, active drainage, nor significant warmth to 

touch. Mild ecchymosis on L thigh. No significant pedal edema. 





Plan:  


* Continue with current pain regimen


* DVT/VTE prophylaxis with home clopidogrel dosing + bilateral lower extremity 

  SCDs 


* Continue with physical therapy and occupational therapy daily while inpatient 


* Anticipate discharge to home with either home health or outpatient PT pending 

  progress tomorrow

## 2021-07-15 RX ADMIN — Medication SCH MG: at 08:02

## 2021-07-15 RX ADMIN — OXYCODONE HYDROCHLORIDE AND ACETAMINOPHEN PRN TAB: 5; 325 TABLET ORAL at 07:58

## 2021-07-15 RX ADMIN — OXYCODONE HYDROCHLORIDE AND ACETAMINOPHEN PRN TAB: 5; 325 TABLET ORAL at 12:42

## 2021-07-15 RX ADMIN — Medication SCH: at 08:12

## 2021-07-15 RX ADMIN — OXYCODONE HYDROCHLORIDE AND ACETAMINOPHEN PRN TAB: 5; 325 TABLET ORAL at 03:51

## 2021-07-15 RX ADMIN — Medication SCH APPLIC: at 07:59

## 2021-07-15 NOTE — PCM.DCSUM1
**Discharge Summary





- Hospital Course


HPI Initial Comments: 





Aleksey 79 y/o male with history of chronic hip pain, symptoms refractory to 

conservative management and elected to undergo surgical treatment. Left total 

hip arthroplasty performed 7/12/21 and tolerated well with no major 

complications. Hospital stay prolonged to allow for further therapy services to 

progress ambulation abilities and functional activities at home. 


Diagnosis: Stroke: No


Modified Foster Scale: No Symptoms at All


Modified Paola Scale Score: 0





- Discharge Data


Discharge Date: 07/15/21


Discharge Disposition: Home, Self-Care 01


Condition: Good





- Referral to Home Health


Date of Face to Face Encounter: 07/15/21


Reason for Homebound Status: motivated to go home, has support at home from 

spouse


Primary Care Physician: 


Andres Ulrich MD








- Discharge Diagnosis/Problem(s)


(1) Postoperative anemia due to acute blood loss


SNOMED Code(s): 22128301100690886


   ICD Code: D62 - ACUTE POSTHEMORRHAGIC ANEMIA   Status: Acute   Current Visit:

Yes   





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


SNOMED Code(s): 901739184439, 382102734064


   ICD Code: Z96.642 - PRESENCE OF LEFT ARTIFICIAL HIP JOINT   Status: Acute   

Current Visit: Yes   





- Patient Summary/Data


Operative Procedure(s) Performed: left total hip arthroplasty


Consults: 


                                  Consultations





07/12/21 11:34


Consult to Case Management/ [CONS] Routine 


   Comment: 


   Physician Instructions: Discharge placement post hip surgery


   Service(s) to be Consulted: Case Management


   Special Instructions: s/p L CORAL, anticipate d/c to home


                           possibly home with home health pending progress 


PT Evaluation and Treatment [CONS] Routine 


   Please Evaluate and Treat.


   PT Reason for Consult: Ambulation


   Discharge Disposition: Home


   Special Instructions: posterior hip precautions, WBAT


   This query below is only for informational purposes and is not editable.


PT Evaluation and Treatment [CONS] Routine 


   Please Evaluate and Treat.


   PT Reason for Consult: Post op Ortho Surgery Hip


   Pending Discharge: Yes, 2- -3 days


   Special Instructions: Schedule first outpatient P.T. appointment 3 - 5 days 

post discharge


   This query below is only for informational purposes and is not editable.





07/12/21 11:37


OT Evaluation and Treatment [CONS] Routine 


   Please Evaluate and Treat.


   OT Reason for Consult: ADL's


   Special Instructions: Status post Hip Surgery


   This query below is only for informational purposes and is not editable.











Hospital Course: 





Patient is a pleasant 79 y/o male, s/p left total hip arthroplasty, POD #3. 

Patient tolerated surgery well with no complications. Remained hemodynamically 

stable throughout hospital stay. POD #1 HgB declined to 10.8; patient did have 

mild dizziness with sit to stand transfers throughout hospital stay; the few 

times these did happen coincided with receiving 2 Percocet tabs. Patient seemed 

to have good pain control without dizziness with a single Percocet tab. Denied 

fever, chills, dyspnea, chest pain, nor fatigue throughout stay. 





Patient completed PT and OT daily while in the hospital. Ambulation abilities 

progressed nicely, able to ambulate 290ft+ with FWW. Demonstrated ability to 

safely complete stairs and improvements in ability to transfer LLE. Demonstrates

 competency in ADLs with OT; used assistive leg reacher to aid in transfers. 





Patient tolerated consistent carbohydrate diet well; endorsed nausea following 

surgery, but none thereafter. 





Knight was discontinued on POD #1. IV saline locked POD #1. Dressing changed on 

POD #2. Patient was able to shower on POD #3. 





Patient required inpatient status for additional therapy services to be safe for

 discharge to home. Patients ambulation abilities have progressed nicely, is no 

longer having dizziness with transfers, and is suitable for discharge to home at

 this time. 





Exam: Left lower extremity neurovascular intact. Left hip with steristrips above

 incision. No surrounding erythema nor drainage. Mild warmth to touch of left 

hip. Mild ecchymosis along lateral left hip and thigh. Dorsiflexion: 4+/5. 

Plantar flexion: 5/5. 

















- Patient Instructions


Diet: Usual Diet as Tolerated


Activity: Apply Ice, Full Weight Bearing


Driving: Do Not Drive


Showering/Bathing: Shower in AM


Wound/Incision Care: Keep Operative Site/Wound Site Clean and Dry, Change 

Dressing Daily


Notify Provider of: Fever, Increased Pain, Swelling and Redness, Drainage





- Discharge Plan


*PRESCRIPTION DRUG MONITORING PROGRAM REVIEWED*: Not Applicable


*COPY OF PRESCRIPTION DRUG MONITORING REPORT IN PATIENT MOE: Not Applicable


Prescriptions/Med Rec: 


Acetaminophen/oxyCODONE [Percocet 325-5 MG] 1 - 2 each PO Q6HR PRN #35 tab


 PRN Reason: Pain


Home Medications: 


                                    Home Meds





Aspirin [Halfprin] 81 mg PO DAILY 08/17/20 [History]


Glucosamine Sulfate 2KCl [Glucosamine] 1 tab PO BID 08/17/20 [History]


Metoprolol Succinate 25 mg PO DAILY 08/17/20 [History]


Multivitamin [Multi-Vitamin Daily] 1 tab PO DAILY 08/17/20 [History]


Nitroglycerin [Nitrostat] 0.4 mg SL ASDIRECTED PRN 08/17/20 [History]


amLODIPine Besylate [Norvasc] 10 mg PO DAILY 08/17/20 [History]


Acetaminophen/Diphenhydramine [Acetaminophen-Diphenhyd 500-25] 1 each PO BEDTIME

PRN 02/01/21 [History]


Hydrochlorothiazide/Losartan [Hyzaar 100-25 MG] 1 tab PO DAILY 06/14/21 

[History]


Clopidogrel Bisulfate [Plavix] 75 mg PO DAILY 07/12/21 [History]


Glimepiride 4 mg PO DAILY 07/12/21 [History]


LORazepam [Ativan] 1 mg PO BID PRN 07/12/21 [History]


Legatrin Pm 1 tab PO BEDTIME 07/12/21 [History]


Niacin 500 mg PO DAILY 07/12/21 [History]


Rosuvastatin [Crestor] 10 mg PO DAILY 07/13/21 [History]


Acetaminophen/oxyCODONE [Percocet 325-5 MG] 1 - 2 each PO Q6HR PRN #35 tab 

07/15/21 [Rx]








Oxygen Therapy Mode: Room Air


Patient Handouts:  Total Hip Replacement, Anterior, Care After, Easy-to-Read, 

Preventing Problems After Surgery, Preventing Constipation After Surgery


Referrals: 


Macho Youssef, PT [Physical Therapist] - 07/22/21 1:00 pm (Please arrive 30 m

inutes early to register for appointment.)


Tobi Eden MD [Physician] - 07/27/21 9:00 am (Please arrive 15 minutes 

early to register for your appointment.  [place] at the ER desk.)





- Discharge Summary/Plan Comment


DC Time >30 min.: No


Discharge Summary/Plan Comment: 








* Discharge to home today with wifes support at home; outpatient PT orders have 

  been arranged 


* Prescription sent to pharmacy for pain control: 5mg-325 mg Percocet, 1-2 tabs 

  q6 hrs prn, #35. Educated on taking NSAIDs for breakthrough pain 


   * Educated patient on constipation with opioids; encouraged to take stool 

     softener while on Percocet 


* Patient to continue home dose of Clopidogrel for DVT/VTE prophylaxis 


* Reviewed SSI warning signs with patient; patient expressed understanding and 

  return precautions


* Patient to follow up with ortho clinic in 2 weeks for surgical follow up; 

  encouraged to reach out sooner if concerns or questions arise prior to schedu

  led apt  





- General Info


Functional Status: Reports: Pain Controlled, Tolerating Diet, Ambulating (with 

FWW ), Urinating, Incentive Spirometry





- Review of Systems


General: Reports: No Symptoms


HEENT: Reports: No Symptoms


Pulmonary: Reports: No Symptoms


Cardiovascular: Reports: No Symptoms


Gastrointestinal: Reports: No Symptoms


Genitourinary: Reports: No Symptoms


Musculoskeletal: Reports: Leg Pain (left ), Joint Pain (left hip )


Skin: Reports: Bruising


Neurological: Reports: No Symptoms


Psychiatric: Reports: Anxiety





- Patient Data


Vitals - Most Recent: 


                                Last Vital Signs











Temp  95.8 F L  07/15/21 10:45


 


Pulse  89   07/15/21 10:45


 


Resp  16   07/15/21 10:45


 


BP  131/53 L  07/15/21 10:45


 


Pulse Ox  96   07/15/21 10:45











Weight - Most Recent: 204 lb 0.005 oz


I&O - Last 24 hours: 


                                 Intake & Output











 07/14/21 07/15/21 07/15/21





 22:59 06:59 14:59


 


Intake Total  300 


 


Balance  300 











Lab Results - Last 24 hrs: 


                         Laboratory Results - last 24 hr











  07/14/21 07/15/21 Range/Units





  16:58 08:02 


 


POC Glucose  147 H  146 H  ()  mg/dL











Med Orders - Current: 


                               Current Medications





Acetaminophen (Acetaminophen 325 Mg Tab)  650 mg PO Q4H PRN


   PRN Reason: Pain/Fever


   Last Admin: 07/14/21 11:44 Dose:  650 mg


   Documented by: 


Hydrocodone Bitart/Acetaminophen (Acetaminophen/Hydrocodone 325-5 Mg Tab)  1 tab

 PO Q4H PRN


   PRN Reason: Pain (mild 1-3)


   Last Admin: 07/14/21 01:26 Dose:  1 tab


   Documented by: 


Amlodipine Besylate (Amlodipine 5 Mg Tab)  10 mg PO DAILY [facility]


   Last Admin: 07/15/21 08:01 Dose:  10 mg


   Documented by: 


Bandage/Support Products (Nozin Nasal )  1 applic NASBOTH BID [facility]


   Stop: 07/19/21 21:01


   Last Admin: 07/15/21 07:59 Dose:  1 applic


   Documented by: 


Clopidogrel Bisulfate (Clopidogrel 75 Mg Tab)  75 mg PO DAILY [facility]


   Last Admin: 07/15/21 08:01 Dose:  75 mg


   Documented by: 


Cyclobenzaprine HCl (Cyclobenzaprine 10 Mg Tab)  10 mg PO BID PRN


   PRN Reason: Muscle Spasm - Painful


   Last Admin: 07/14/21 01:55 Dose:  10 mg


   Documented by: 


Diphenhydramine HCl (Diphenhydramine 25 Mg Cap)  25 mg PO Q6H PRN


   PRN Reason: Itching


   Last Admin: 07/13/21 22:35 Dose:  25 mg


   Documented by: 


Docusate Sodium (Docusate Sodium 100 Mg Cap)  100 mg PO BID [facility]


   Last Admin: 07/15/21 08:01 Dose:  100 mg


   Documented by: 


Glimepiride (Glimepiride 2 Mg Tab)  4 mg PO DAILY [facility]


   Last Admin: 07/15/21 08:02 Dose:  4 mg


   Documented by: 


Hydrochlorothiazide (Hydrochlorothiazide 25 Mg Tab)  25 mg PO DAILY [facility]


   Last Admin: 07/15/21 08:07 Dose:  25 mg


   Documented by: 


Sodium Chloride (Normal Saline)  1,000 mls @ 125 mls/hr IV ASDIRECTED [facility]


   Last Admin: 07/12/21 23:33 Dose:  125 mls/hr


   Documented by: 


Lorazepam (Lorazepam 1 Mg Tab)  1 mg PO BID PRN


   PRN Reason: Anxiety


   Last Admin: 07/13/21 22:35 Dose:  1 mg


   Documented by: 


Losartan Potassium (Losartan 50 Mg Tab)  100 mg PO DAILY [facility]


   Last Admin: 07/15/21 08:06 Dose:  100 mg


   Documented by: 


Magnesium Hydroxide (Magnesium Hydroxide 400 Mg/5 Ml Susp 30 Ml Cup)  30 ml PO 

Q6H PRN


   PRN Reason: Stool Softener


   Last Admin: 07/14/21 21:25 Dose:  30 ml


   Documented by: 


Metoprolol Succinate (Metoprolol Succinate 25 Mg Tab.Er)  25 mg PO DAILY [facility]


   Last Admin: 07/15/21 08:13 Dose:  25 mg


   Documented by: 


Morphine Sulfate (Morphine 2 Mg/Ml Syringe)  1 mg IV Q1H PRN


   PRN Reason: Breakthrough Pain


   Last Admin: 07/12/21 14:34 Dose:  1 mg


   Documented by: 


Niacin (Niacin 250 Mg Tab.Er)  500 mg PO DAILY [facility]


   Last Admin: 07/15/21 08:02 Dose:  500 mg


   Documented by: 


Nitroglycerin (Nitroglycerin 0.4 Mg Tab.Sl)  0.4 mg SL ASDIRECTED PRN


   PRN Reason: Other


Ondansetron HCl (Ondansetron 4 Mg/2 Ml Sdv)  4 mg IVPUSH Q6H PRN


   PRN Reason: Nausea/Vomiting


Oxycodone/Acetaminophen (Acetaminophen/Oxycodone 325-5 Mg Tab)  1 - 2 tab PO Q4H

 PRN


   PRN Reason: Pain


   Last Admin: 07/15/21 07:58 Dose:  2 tab


   Documented by: 


Glucosamine 1,000mg (Tab (Ptom))  1 each PO BID [facility]


   Last Admin: 07/15/21 08:12 Dose:  Not Given


   Documented by: 


Rosuvastatin Calcium (Rosuvastatin 10 Mg Tab)  10 mg PO DAILY [facility]


   Last Admin: 07/15/21 08:07 Dose:  10 mg


   Documented by: 





Discontinued Medications





Amlodipine Besylate (Amlodipine 5 Mg Tab)  10 mg PO DAILY [facility]


Bandage/Support Products (Nozin Nasal )  1 applic NASBOTH ONETIME ONE


   Stop: 07/12/21 08:01


   Last Admin: 07/12/21 08:20 Dose:  3 swab


   Documented by: 


Clopidogrel Bisulfate (Clopidogrel 75 Mg Tab)  75 mg PO DAILY [facility]


Docusate Sodium (Docusate Sodium 100 Mg Cap)  100 mg PO BID [facility]


Ephedrine Sulfate (Ephedrine 50 Mg/Ml Sdv) Confirm Administered Dose 50 mg 

.ROUTE .STK-MED ONE


   Stop: 07/12/21 10:15


Fentanyl (Fentanyl 100 Mcg/2 Ml Sdv) Confirm Administered Dose 100 mcg .ROUTE 

.STK-MED ONE


   Stop: 07/12/21 08:30


Lactated Ringer's (Ringers, Lactated)  1,000 mls @ 75 mls/hr IV ASDIRECTED [facility]


   Last Admin: 07/12/21 08:21 Dose:  75 mls/hr


   Documented by: 


Cefazolin Sodium/Dextrose 2 gm (/ Premix)  50 mls @ 100 mls/hr IV ONETIME ONE


   Stop: 07/12/21 08:59


   Last Admin: 07/12/21 09:47 Dose:  100 mls/hr


   Documented by: 


Cefazolin Sodium 1 gm/ Sodium (Chloride)  50 mls @ 200 mls/hr IV Q8H [facility]


   Stop: 07/13/21 03:59


   Last Admin: 07/12/21 15:29 Dose:  Not Given


   Documented by: 


Cefazolin Sodium/Dextrose 1 gm (/ Premix)  50 mls @ 100 mls/hr IV Q8H [facility]


   Stop: 07/13/21 10:29


   Last Admin: 07/13/21 10:40 Dose:  100 mls/hr


   Documented by: 


Ketorolac Tromethamine (Ketorolac 30 Mg/Ml Sdv)  30 mg IVPUSH ONETIME ONE


   Stop: 07/12/21 15:41


   Last Admin: 07/12/21 15:49 Dose:  30 mg


   Documented by: 


Metoprolol Succinate (Metoprolol Succinate 25 Mg Tab.Er)  25 mg PO DAILY [facility]


Midazolam HCl (Midazolam 1 Mg/Ml 2 Ml Sdv) Confirm Administered Dose 2 mg .ROUTE

 .STK-MED ONE


   Stop: 07/12/21 08:31


Midazolam HCl (Midazolam 1 Mg/Ml 2 Ml Sdv) Confirm Administered Dose 2 mg .ROUTE

 .STK-MED ONE


   Stop: 07/12/21 08:57


Non-Formulary Medication (Glimepiride [Glimepiride])  4 mg PO DAILY [facility]


Non-Formulary Medication (Glucosamine Sulfate 2kcl [Glucosamine])  1 tab PO BID 

[facility]


Non-Formulary Medication (Hydrochlorothiazide/Losartan [Hyzaar 100-25 Mg])  1 

tab PO DAILY [facility]


Non-Formulary Medication (Niacin [Niacin])  500 mg PO DAILY [facility]


Non-Formulary Medication (Rosuvastatin [Crestor])  20 mg PO DAILY [facility]


Povidone Iodine (Povidone-Iodine 10% Soln 118.25 Ml Bottle) Confirm Administered

 Dose 1 ml .ROUTE .STK-MED ONE


   Stop: 07/12/21 06:25


   Last Admin: 07/12/21 10:31 Dose:  15 ml


   Documented by: 


Propofol (Propofol 200 Mg/20 Ml Sdv) Confirm Administered Dose 200 mg .ROUTE 

.STK-MED ONE


   Stop: 07/12/21 08:30











- Exam


Quality Assessment: Reports: DVT Prophylaxis


General: Reports: Alert, Oriented, Cooperative


Extremities: Joint Swelling, Leg Pain (left )


Skin: Reports: Dry, Intact


Wound/Incisions: Reports: Healing Well, Dressing Dry and Intact


Psy/Mental Status: Reports: Alert, Anxious Discharge instructions reviewed with patient. Patient voices understanding. Patient advised to follow up as directed on discharge instructions. Patient denies questions, needs or concerns at discharge regarding discharge instructions. Patient voiced understanding. No s/sx of distress noted. Armband removed and placed in shredder box.

## 2021-07-19 NOTE — PCM.EKG
** #1 Interpretation


EKG Date: 07/12/21


Time: 07:25


Rhythm: NSR


Rate (Beats/Min): 61


Axis: Normal


P-Wave: Present


QRS: Normal


ST-T: Normal


QT: Normal


Comparison: NA - No Prior EKG


EKG Interpretation Comments: 





Normal EKG

## 2021-07-26 NOTE — OR
DATE OF PROCEDURE:  07/12/2021

 

SURGEON:  Tobi Eden MD

 

PREOPERATIVE DIAGNOSIS:  Osteoarthritis, left hip.

 

POSTOPERATIVE DIAGNOSIS:  Osteoarthritis, left hip, with impingement and degenerative labral

tear.

 

PROCEDURE PERFORMED:  Left total hip arthroplasty using Aaron M/L taper stem size 12.5

extended offset, 60 mm trabecular metal cup, +0 36 mm femoral head.

 

ASSISTANT:  KELY Barragan

 

ANESTHESIA:  Spinal with sedation.

 

INDICATIONS:  Michael is a 80-year-old gentleman with a history of progressive and

persistent pain in his left hip.  He has failed conservative treatment.  X-rays reveal

exostosis, impingement, and moderate degenerative changes.  He now presents for left total

hip arthroplasty.  Risks, benefits, and potential complications of the procedure were

discussed.

 

DESCRIPTION OF PROCEDURE:  After adequate anesthesia was obtained, the patient was placed in

the lateral decubitus position and secured with the hip positioner.  The left hip and leg

were prepped and draped in a sterile fashion.  A longitudinal incision was made over the

greater trochanter and carried down through the subcutaneous tissues, and hemostasis was

obtained with electrocautery.  IT band and tensor fascia were split in line with their

fibers, and a Charnley retractor was placed.  The short external rotators were taken off the

greater trochanter with electrocautery, and the capsule was opened in a T-fashion.  The hip

was dislocated.  Retractor was placed about the femoral neck, and the femoral neck was cut

with an oscillating saw.  Retractor was then placed about the acetabulum.  Examination

revealed extensive maceration of the labrum anteriorly.  Remnants of the labrum were

excised.  Soft tissues were cleared from the central acetabulum which was sequentially

reamed to 60 mm.  This had good subchondral bone.  A lateral osteophyte exostosis was

removed with a combination of osteotome and rongeur.  Our trabecular metal Continuum cup was

press-fit into position and additional fixation obtained with an acetabular screw.

Excellent purchase was obtained with this.  The cup was irrigated, and standard liner was

secured into the cup.

 

Attention was returned to the femur.  A box osteotome was used to remove the lateral femoral

neck cortex.  A hand awl was placed down the canal, and the lateralizing reamer was

utilized.  The canal was then sequentially broached to 12.5.  An extended offset neck trial

was utilized with 0 neck length showing excellent stability, and this appeared to recreate

limb length.  The trials were removed.  The stem was press-fit into position with excellent

fit and fill.  Trial reduction was again done with 0 neck length which was selected and

placed onto the Sanchez taper, tapped into position, and the hip was reduced.  It showed good

tension and extension and very good stability in flexion, adduction, and internal rotation.

The hip was irrigated with pulse lavage.  This was followed by dilute Betadine solution

which was left in place for 2-1/2

minutes.  The hip was irrigated once again with pulse lavage.  The capsule was closed with

#2 Ethibond.  The IT band also closed with #2 Ethibond in a running locking fashion.  The

skin was closed with 2-0 Vicryl and a running 3-0

Monocryl.  Steri-Strips were applied.  Sterile dressing was then placed.  The patient

tolerated the procedure well.  There were no complications. He was taken from the operating

room in stable condition.

 

 

 

 

Tobi Eden MD

DD:  07/26/2021 09:44:36

DT:  07/26/2021 14:23:51

Job #:  [id number]